# Patient Record
(demographics unavailable — no encounter records)

---

## 2024-12-19 NOTE — REASON FOR VISIT
[Follow-Up] : a follow-up visit [Home] : at home, [unfilled] , at the time of the visit. [Medical Office: ([facility])___] : at the medical office located in  [Patient] : the patient [FreeTextEntry2] : sleep apnea

## 2024-12-19 NOTE — HISTORY OF PRESENT ILLNESS
[FreeTextEntry1] : 31 y/o M with h/o Anxiety, Depression, and Obesity (BMI 51) presenting for follow up of sleep apnea and daytime somnolence.  He was initially seen by me 8/16/2024 for symptoms of sleep fragmentation, snoring, witnessed apneas, and daytime fatigue.  Of note, he has been treated with Seroquel for symptoms of anxiety.  He underwent diagnostic PSG in July which demonstrated overall mild sleep disordered breathing with RDI of 5.3/h (AHI 3.4/h) with increase in frequency during REM (REM RDI 13.7/h).  Due to symptoms he was recommended for further evaluation with CPAP therapy. Underwent CPAP titration on 11/27/24 which demonstrated normalization of AHI on CPAP 7 cm H2O.   Reports that over the past 2-3 days has had worsening of his sleep quality without clear etiology of acute onset. No recent changes in behaviors or medications. Continues to have symptoms of nocturnal awakenings and daytime fatigue. Weight has been overall stable. Able to tolerate CPAP mask though denies significant changes in sleep quality or daytime energy. He was transitioned to trazodone from seroquel and remains on cymbalta.   Diagnostic studies: PSG 7/30/24: AHI 3.4/hr REM 12.6/hr RDI 5.3/hr T90 1.9% [Snoring] : snoring [Frequent Nocturnal Awakening] : frequent nocturnal awakening

## 2024-12-19 NOTE — ASSESSMENT
[FreeTextEntry1] : 33 y/o M with h/o Anxiety, Depression, and Obesity (BMI 51) presenting for follow up of sleep apnea and daytime somnolence. Initially seen for symptoms of sleep fragmentation, snoring, witnessed apneas, daytime fatigue.  He underwent diagnostic polysomnogram with study from 7/30/2024 demonstrating mild sleep disordered breathing with RDI of 5.3/h with elevation frequency during REM sleep (REM RDI 13.7/h).  Due to ongoing symptoms he was referred for CPAP titration which showed normalization of sleep disordered breathing on 7 cm H2O. he is interested in pursuing CPAP therapy and prescription will be sent to a durable medical equipment supplier.  Compliance requirements for usage were discussed.  He will follow-up with me in 3 months to assess efficacy and usage.  He is instructed to reach out sooner if he experiences difficulty with tolerance.  Follow-up in 2 to 3 months.

## 2024-12-19 NOTE — HISTORY OF PRESENT ILLNESS
[FreeTextEntry1] : 33 y/o M with h/o Anxiety, Depression, and Obesity (BMI 51) presenting for follow up of sleep apnea and daytime somnolence.  He was initially seen by me 8/16/2024 for symptoms of sleep fragmentation, snoring, witnessed apneas, and daytime fatigue.  Of note, he has been treated with Seroquel for symptoms of anxiety.  He underwent diagnostic PSG in July which demonstrated overall mild sleep disordered breathing with RDI of 5.3/h (AHI 3.4/h) with increase in frequency during REM (REM RDI 13.7/h).  Due to symptoms he was recommended for further evaluation with CPAP therapy. Underwent CPAP titration on 11/27/24 which demonstrated normalization of AHI on CPAP 7 cm H2O.   Reports that over the past 2-3 days has had worsening of his sleep quality without clear etiology of acute onset. No recent changes in behaviors or medications. Continues to have symptoms of nocturnal awakenings and daytime fatigue. Weight has been overall stable. Able to tolerate CPAP mask though denies significant changes in sleep quality or daytime energy. He was transitioned to trazodone from seroquel and remains on cymbalta.   Diagnostic studies: PSG 7/30/24: AHI 3.4/hr REM 12.6/hr RDI 5.3/hr T90 1.9% [Snoring] : snoring [Frequent Nocturnal Awakening] : frequent nocturnal awakening

## 2024-12-19 NOTE — REASON FOR VISIT
[Follow-Up] : a follow-up visit [Home] : at home, [unfilled] , at the time of the visit. [Medical Office: ([facility])___] : at the medical office located in  [Patient] : the patient [FreeTextEntry2] : sleep apnea

## 2025-03-12 NOTE — HISTORY OF PRESENT ILLNESS
[To Bed: ___] : ~he/she~ goes to bed at [unfilled] [Arises: ___] : arises at [unfilled] [Sleep Onset Latency: ___ minutes] : sleep onset latency of [unfilled] minutes reported [Nocturnal Awakenings: ___] : ~he/she~ typically has [unfilled] nocturnal awakenings [WASO: ___] : Wake time after sleep onset is [unfilled] [TST: ___] : Total sleep time is [unfilled] [Daytime Sleep: ___] : daytime sleep: [unfilled] [Frequent Nocturnal Awakening] : frequent nocturnal awakening [Awakes Unrefreshed] : awakening unrefreshed [Awakening With Dry Mouth] : awakening with dry mouth [Recent  Weight Gain] : recent weight gain [DMS] : DMS [FreeTextEntry1] : 32 y/o M with overall mild FAYE presents after initiating APAP therapy.  PMHx include Anxiety, Depression, Obesity Class III  PSG 7/30/24: AHI 3.4/hr, RDI 5.3/hr, REM AHI 12.6/hr, T 90 of 1.9% CPAP Titration 11-: Optimal pressure of 7 cmH20 with F&P Eson 2 M  TX: APAP 7-20 cmH20. Device: AirSense 11 Autoset provided by [employer], [place], on 1/7/2025  The patient has been using APAP on a nightly basis for the entirety of sleep. However, he notes no difference to his energy levels, stating that he still fells tired with low energy. Though he feels tired, he denies unintentional sleep episodes and drowsy driving. Denies AM headaches.  The nasal pillow and pressures are well tolerated.  Gained weight, with current reported weight of 354 lbs.  Remains on Cymbalta 30 mg daily in AM and Trazodone 50 mg nightly, prescribed by his psychiatrist.  Had Covid 3-4 weeks ago with residual difficulty concentrating and feeling foggy.    [Unintentional Sleep while Active] : no unintentional sleep while active [Unintentional Sleep While Inactive] : no unintentional sleep while inactive [Awakes with Headache] : no headache upon awakening [Daytime Somnolence] : no daytime somnolence [DIS] : no DIS [Unusual Sleep Behavior] : no unusual sleep behavior [Hypersomnolence] : no hypersomnolence [Cataplexy] : no cataplexy [Sleep Paralysis] : no sleep paralysis [Hypnagogic Hallucinations] : no hallucinations when falling asleep [Hypnopompic Hallucinations] : no hallucinations when awakening [Lower Extremity Discomfort] : no lower extremity discomfort in evening or at bedtime [ESS] : 2

## 2025-03-12 NOTE — REASON FOR VISIT
[Home] : at home, [unfilled] , at the time of the visit. [Medical Office: ([facility])___] : at the medical office located in  [Telephone (audio)] : This telephonic visit was provided via audio only technology. [Technical] : patient unable to effectively utilize tele-video due to technical issues. [Verbal consent obtained from patient] : the patient, [unfilled] [Follow-Up] : a follow-up visit [Sleep Apnea] : sleep apnea

## 2025-03-12 NOTE — PHYSICAL EXAM
[] : no respiratory distress [Oriented To Time, Place, And Person] : oriented to person, place, and time [Impaired Insight] : insight and judgment were intact [Affect] : the affect was normal [Mood] : the mood was normal

## 2025-03-12 NOTE — ASSESSMENT
[FreeTextEntry1] : 34 y/o M with overall mild FAYE--AHI 3.4/hr, RDI 5.3/hr, REM AHI 12.6/hr, presents after initiating APAP therapy.  PMHx include Anxiety, Depression, Obesity Class III  Discussed therapy and compliance data with the patient. Therapy-based AHI of 2.2/hr on 7-20 cmH20, 95th percentile pressure of 11.4 cmH20. Compliant 100% of nights, averaging 7-hours and 39 minutes, without significant mask leak.   The patient continues to feel tired with low energy despite effective pap therapy for mild sleep apnea with sufficient sleep time. Thought tired, he denies EDS with an ESS score of 2 and drowsy driving.  Patient with comorbid Depression and Anxiety, which can also contribute to his fatigue levels. Therefore, the patient was provided with the Becks Depression Scale and Fatigue Severity scale, via email, to be completed and returned for our review. Patient remains on Cymbalta 30 mg daily in AM and Trazadone 50 mg at night, prescribed by psychiatrist. Advised patient to optimize management of depression and anxiety with psychiatrist.  Also advised to follow up with PCP for blood work to evaluate other causes such as thyroid, diabetes, vitamin d levels, etc.  Weight loss strongly encouraged. The patient states he will be seeing a nutritionist for weight management. Discussed possible weight loss drugs for management, which patient may consider. He verbalized understanding on the role of weight as it relates to FAYE.  The ramifications of FAYE and the importance of continued treatment was discussed with the patient.  F/U in 3-months or sooner, if needed.

## 2025-03-12 NOTE — REVIEW OF SYSTEMS
[Fatigue] : fatigue [Recent Wt Gain (___ Lbs)] : recent [unfilled] ~Ulb weight gain [A.M. Dry Mouth] : a.m. dry mouth [Obesity] : obesity [Depression] : depression [Anxious] : anxious [Negative] : Musculoskeletal [Chest Pain] : no chest pain [CHF] : no congestive heart failure [A.M. Headache] : no headache present upon awakening [de-identified] : denies SI and HI. On Cymbalta follows psychiatrist and therapist

## 2025-04-24 NOTE — ASSESSMENT
[Vaccines Reviewed] : Immunizations reviewed today. Please see immunization details in the vaccine log within the immunization flowsheet.  [FreeTextEntry1] : He was counseled regarding diet, exercise, weight loss.  He plans to start to exercise.  The option of a GLP1 agonist was discussed but he refuses for now.  He had COVID-19 recently which resolved.  He has had the current COVID-19 vaccine.  He sees a pulmonologist for FAYE and he is on CPAP.  The previous breast lump resolved so he didn't do the advised testing.    He sees his psychiatrist.  Symptoms similar.    Check a B-12- not taking a supplement now.

## 2025-04-24 NOTE — HISTORY OF PRESENT ILLNESS
[FreeTextEntry1] : The patient is here for a routine visit.  [de-identified] : He tries to watch his diet.  He isn't exercising but he plans to start.  He has similar fatigue and lack of energy.

## 2025-04-24 NOTE — HEALTH RISK ASSESSMENT
[No falls in past year] : Patient reported no falls in the past year [0] : 1) Little interest or pleasure doing things: Not at all (0) [3] : 2) Feeling down, depressed, or hopeless for nearly every day (3) [Fully functional (bathing, dressing, toileting, transferring, walking, feeding)] : Fully functional (bathing, dressing, toileting, transferring, walking, feeding) [Fully functional (using the telephone, shopping, preparing meals, housekeeping, doing laundry, using] : Fully functional and needs no help or supervision to perform IADLs (using the telephone, shopping, preparing meals, housekeeping, doing laundry, using transportation, managing medications and managing finances) [[id number]] : 3

## 2025-04-24 NOTE — HEALTH RISK ASSESSMENT
[No falls in past year] : Patient reported no falls in the past year [0] : 1) Little interest or pleasure doing things: Not at all (0) [3] : 2) Feeling down, depressed, or hopeless for nearly every day (3) [Fully functional (bathing, dressing, toileting, transferring, walking, feeding)] : Fully functional (bathing, dressing, toileting, transferring, walking, feeding) [Fully functional (using the telephone, shopping, preparing meals, housekeeping, doing laundry, using] : Fully functional and needs no help or supervision to perform IADLs (using the telephone, shopping, preparing meals, housekeeping, doing laundry, using transportation, managing medications and managing finances) [[id number]] : 3

## 2025-04-24 NOTE — PHYSICAL EXAM
[No Acute Distress] : no acute distress [Well Nourished] : well nourished [Well Developed] : well developed [Well-Appearing] : well-appearing [Normal Sclera/Conjunctiva] : normal sclera/conjunctiva [PERRL] : pupils equal round and reactive to light [EOMI] : extraocular movements intact [Normal Outer Ear/Nose] : the outer ears and nose were normal in appearance [Normal Oropharynx] : the oropharynx was normal [No JVD] : no jugular venous distention [No Lymphadenopathy] : no lymphadenopathy [Supple] : supple [Thyroid Normal, No Nodules] : the thyroid was normal and there were no nodules present [No Respiratory Distress] : no respiratory distress  [No Accessory Muscle Use] : no accessory muscle use [Clear to Auscultation] : lungs were clear to auscultation bilaterally [Normal Rate] : normal rate  [Regular Rhythm] : with a regular rhythm [Normal S1, S2] : normal S1 and S2 [No Murmur] : no murmur heard [No Carotid Bruits] : no carotid bruits [No Abdominal Bruit] : a ~M bruit was not heard ~T in the abdomen [No Varicosities] : no varicosities [Pedal Pulses Present] : the pedal pulses are present [No Edema] : there was no peripheral edema [No Palpable Aorta] : no palpable aorta [No Extremity Clubbing/Cyanosis] : no extremity clubbing/cyanosis [Soft] : abdomen soft [Non Tender] : non-tender [Non-distended] : non-distended [No Masses] : no abdominal mass palpated [No HSM] : no HSM [Normal Bowel Sounds] : normal bowel sounds [Normal Posterior Cervical Nodes] : no posterior cervical lymphadenopathy [Normal Anterior Cervical Nodes] : no anterior cervical lymphadenopathy [No CVA Tenderness] : no CVA  tenderness [No Spinal Tenderness] : no spinal tenderness [No Joint Swelling] : no joint swelling [Grossly Normal Strength/Tone] : grossly normal strength/tone [No Rash] : no rash [Coordination Grossly Intact] : coordination grossly intact [No Focal Deficits] : no focal deficits [Normal Gait] : normal gait [Deep Tendon Reflexes (DTR)] : deep tendon reflexes were 2+ and symmetric [Normal Affect] : the affect was normal [Normal Insight/Judgement] : insight and judgment were intact [de-identified] : obese [de-identified] : normal male, no masses

## 2025-04-24 NOTE — HISTORY OF PRESENT ILLNESS
[FreeTextEntry1] : The patient is here for a routine visit.  [de-identified] : He tries to watch his diet.  He isn't exercising but he plans to start.  He has similar fatigue and lack of energy.

## 2025-04-24 NOTE — PHYSICAL EXAM
[No Acute Distress] : no acute distress [Well Nourished] : well nourished [Well Developed] : well developed [Well-Appearing] : well-appearing [Normal Sclera/Conjunctiva] : normal sclera/conjunctiva [PERRL] : pupils equal round and reactive to light [EOMI] : extraocular movements intact [Normal Outer Ear/Nose] : the outer ears and nose were normal in appearance [Normal Oropharynx] : the oropharynx was normal [No JVD] : no jugular venous distention [No Lymphadenopathy] : no lymphadenopathy [Supple] : supple [Thyroid Normal, No Nodules] : the thyroid was normal and there were no nodules present [No Respiratory Distress] : no respiratory distress  [No Accessory Muscle Use] : no accessory muscle use [Clear to Auscultation] : lungs were clear to auscultation bilaterally [Normal Rate] : normal rate  [Regular Rhythm] : with a regular rhythm [Normal S1, S2] : normal S1 and S2 [No Murmur] : no murmur heard [No Carotid Bruits] : no carotid bruits [No Abdominal Bruit] : a ~M bruit was not heard ~T in the abdomen [No Varicosities] : no varicosities [Pedal Pulses Present] : the pedal pulses are present [No Edema] : there was no peripheral edema [No Palpable Aorta] : no palpable aorta [No Extremity Clubbing/Cyanosis] : no extremity clubbing/cyanosis [Soft] : abdomen soft [Non Tender] : non-tender [Non-distended] : non-distended [No Masses] : no abdominal mass palpated [No HSM] : no HSM [Normal Bowel Sounds] : normal bowel sounds [Normal Posterior Cervical Nodes] : no posterior cervical lymphadenopathy [Normal Anterior Cervical Nodes] : no anterior cervical lymphadenopathy [No CVA Tenderness] : no CVA  tenderness [No Spinal Tenderness] : no spinal tenderness [No Joint Swelling] : no joint swelling [Grossly Normal Strength/Tone] : grossly normal strength/tone [No Rash] : no rash [Coordination Grossly Intact] : coordination grossly intact [No Focal Deficits] : no focal deficits [Normal Gait] : normal gait [Deep Tendon Reflexes (DTR)] : deep tendon reflexes were 2+ and symmetric [Normal Affect] : the affect was normal [Normal Insight/Judgement] : insight and judgment were intact [de-identified] : obese [de-identified] : normal male, no masses